# Patient Record
Sex: FEMALE | URBAN - METROPOLITAN AREA
[De-identification: names, ages, dates, MRNs, and addresses within clinical notes are randomized per-mention and may not be internally consistent; named-entity substitution may affect disease eponyms.]

---

## 2017-11-08 NOTE — PATIENT DISCUSSION
The patient feels that the cataract is significantly impacting daily activities and has elected cataract surgery. The risks, benefits, and alternatives to surgery were discussed. The patient elects to proceed with surgery. Patient elects PCIOL OS goal Sandra CV or BASIC.

## 2017-11-08 NOTE — PATIENT DISCUSSION
Patient understands condition, prognosis and need for follow up care. PO care by Dr Jose Angel Ivan.

## 2017-11-08 NOTE — PATIENT DISCUSSION
The patient feels that the cataract is significantly impacting daily activities and has elected cataract surgery. The risks, benefits, and alternatives to surgery were discussed. The patient elects to proceed with surgery. R/R after first stable.

## 2019-07-22 ENCOUNTER — IMPORTED ENCOUNTER (OUTPATIENT)
Dept: URBAN - METROPOLITAN AREA CLINIC 9 | Facility: CLINIC | Age: 59
End: 2019-07-22

## 2019-07-24 ENCOUNTER — IMPORTED ENCOUNTER (OUTPATIENT)
Dept: URBAN - METROPOLITAN AREA CLINIC 9 | Facility: CLINIC | Age: 59
End: 2019-07-24

## 2019-08-08 ENCOUNTER — IMPORTED ENCOUNTER (OUTPATIENT)
Dept: URBAN - METROPOLITAN AREA CLINIC 9 | Facility: CLINIC | Age: 59
End: 2019-08-08

## 2019-08-29 ENCOUNTER — IMPORTED ENCOUNTER (OUTPATIENT)
Dept: URBAN - METROPOLITAN AREA CLINIC 9 | Facility: CLINIC | Age: 59
End: 2019-08-29

## 2019-10-01 ENCOUNTER — IMPORTED ENCOUNTER (OUTPATIENT)
Dept: URBAN - METROPOLITAN AREA CLINIC 9 | Facility: CLINIC | Age: 59
End: 2019-10-01

## 2021-08-15 NOTE — PATIENT DISCUSSION
Post op gtt instructions reviewed with patient. patient presented for medical induction of labor for GDMA2  on insulin  s/p   uncomplicated   covid neg

## 2021-10-18 ASSESSMENT — VISUAL ACUITY
OD_SC: 20/200 SN
OD_SC: 20/50 -2 SN
OS_CC: 20/20 SN
OS_SC: 20/50 -2 SN
OD_SC: 20/60 SN
OD_CC: 20/20 -2 SN
OS_SC: 20/60 SN
OS_SC: 20/50 -2 SN

## 2021-10-18 ASSESSMENT — TONOMETRY
OD_IOP_MMHG: 14
OS_IOP_MMHG: 18

## 2021-10-18 ASSESSMENT — KERATOMETRY
OD_AXISANGLE2_DEGREES: 81
OS_K2POWER_DIOPTERS: 43.25
OS_AXISANGLE_DEGREES: 146
OD_K2POWER_DIOPTERS: 42.5
OD_AXISANGLE_DEGREES: 171
OD_K1POWER_DIOPTERS: 41.5
OS_AXISANGLE2_DEGREES: 56
OS_K1POWER_DIOPTERS: 42.5

## 2022-06-21 RX ORDER — ESTRADIOL 0.1 MG/G
CREAM VAGINAL
COMMUNITY
Start: 2020-09-03

## 2022-06-21 RX ORDER — CLONAZEPAM 0.5 MG/1
TABLET ORAL
COMMUNITY

## 2022-06-21 RX ORDER — LEVOTHYROXINE SODIUM 137 UG/1
TABLET ORAL
COMMUNITY

## 2022-06-21 RX ORDER — PAROXETINE 10 MG/1
TABLET, FILM COATED ORAL
COMMUNITY
Start: 2015-11-05